# Patient Record
Sex: FEMALE | URBAN - METROPOLITAN AREA
[De-identification: names, ages, dates, MRNs, and addresses within clinical notes are randomized per-mention and may not be internally consistent; named-entity substitution may affect disease eponyms.]

---

## 2020-09-02 ENCOUNTER — APPOINTMENT (OUTPATIENT)
Dept: URBAN - METROPOLITAN AREA SURGERY 18 | Age: 70
Setting detail: DERMATOLOGY
End: 2020-09-02

## 2020-09-02 VITALS — SYSTOLIC BLOOD PRESSURE: 120 MMHG | TEMPERATURE: 97.8 F | HEART RATE: 60 BPM | DIASTOLIC BLOOD PRESSURE: 75 MMHG

## 2020-09-02 PROBLEM — D04.39 CARCINOMA IN SITU OF SKIN OF OTHER PARTS OF FACE: Status: ACTIVE | Noted: 2020-09-02

## 2020-09-02 PROCEDURE — 17311 MOHS 1 STAGE H/N/HF/G: CPT

## 2020-09-02 PROCEDURE — 17312 MOHS ADDL STAGE: CPT

## 2020-09-02 PROCEDURE — OTHER REFERRAL CORRESPONDENCE: OTHER

## 2020-09-02 PROCEDURE — OTHER COUNSELING: OTHER

## 2020-09-02 PROCEDURE — OTHER MOHS SURGERY: OTHER

## 2021-11-09 ENCOUNTER — APPOINTMENT (OUTPATIENT)
Dept: URBAN - METROPOLITAN AREA SURGERY 18 | Age: 71
Setting detail: DERMATOLOGY
End: 2021-11-12

## 2021-11-09 VITALS
TEMPERATURE: 97.5 F | RESPIRATION RATE: 22 BRPM | SYSTOLIC BLOOD PRESSURE: 98 MMHG | HEART RATE: 59 BPM | DIASTOLIC BLOOD PRESSURE: 55 MMHG

## 2021-11-09 PROBLEM — C44.321 SQUAMOUS CELL CARCINOMA OF SKIN OF NOSE: Status: ACTIVE | Noted: 2021-11-09

## 2021-11-09 PROCEDURE — OTHER REFERRAL CORRESPONDENCE: OTHER

## 2021-11-09 PROCEDURE — 17311 MOHS 1 STAGE H/N/HF/G: CPT

## 2021-11-09 PROCEDURE — 17312 MOHS ADDL STAGE: CPT

## 2021-11-09 PROCEDURE — OTHER MOHS SURGERY: OTHER

## 2021-11-09 PROCEDURE — OTHER COUNSELING: OTHER

## 2021-11-09 PROCEDURE — 13152 CMPLX RPR E/N/E/L 2.6-7.5 CM: CPT

## 2021-11-09 NOTE — PROCEDURE: MOHS SURGERY
Hx of migraines.  Migraine and left sided jaw pain started yesterday along with generalized muscle tightness. Pt denies any TMJ. She has been only eating soft foods in attempt to decrease pain with no relief.   Loose cough noted at triage; pt reports seasonal allergies.    Depth Of Tumor Invasion (For Histology): dermis

## 2021-11-09 NOTE — HPI: PROCEDURE (MOHS)
Has The Growth Been Previously Biopsied?: has been previously biopsied
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
Year Removed: 1900

## 2021-11-23 ENCOUNTER — APPOINTMENT (OUTPATIENT)
Dept: URBAN - METROPOLITAN AREA SURGERY 18 | Age: 71
Setting detail: DERMATOLOGY
End: 2021-11-25

## 2021-11-23 DIAGNOSIS — Z48.817 ENCOUNTER FOR SURGICAL AFTERCARE FOLLOWING SURGERY ON THE SKIN AND SUBCUTANEOUS TISSUE: ICD-10-CM

## 2021-11-23 PROCEDURE — OTHER POST-OP WOUND CHECK: OTHER

## 2021-11-23 PROCEDURE — 99024 POSTOP FOLLOW-UP VISIT: CPT

## 2021-11-23 ASSESSMENT — LOCATION SIMPLE DESCRIPTION DERM: LOCATION SIMPLE: NOSE

## 2021-11-23 ASSESSMENT — LOCATION ZONE DERM: LOCATION ZONE: NOSE

## 2021-11-23 ASSESSMENT — LOCATION DETAILED DESCRIPTION DERM: LOCATION DETAILED: RIGHT NASAL DORSUM

## 2021-11-23 NOTE — PROCEDURE: POST-OP WOUND CHECK
Add 06997 Cpt? (Important Note: In 2017 The Use Of 58237 Is Being Tracked By Cms To Determine Future Global Period Reimbursement For Global Periods): yes
Additional Comments: Well approximated suture line. Healing within normal limits. Mild erythema lateral to incision, pt complains of reaction to tape. Cleaned with sterile saline, dressed with Vaseline, Telfa, and tape. Pt to begin daily dressing changes. At 5 weeks PostOp the patient can begin gentle massage. Educated pt about evidence of spitting suture and to call clinic if that arises, and importance of sunscreen as well as sun protection to surgical area. Pt also concerned about erythema at left nasolabial fold, not a concern from surgery (previously treated by her dermatologist for irritation/dry flaking in this area). Appears to be seborrheic dermatitis, potentially compounded by irritant/allergic contact dermatitis (Pt has been using triple antibiotic ointment which we advised her to stop using). Will use OTC hydrocortisone for 5-7 days to decrease inflammation.She has an appointment with her primary dermatologist next week, but will call and be seen sooner should the redness change in any way (expand, new/changing symptoms, new lesions/color change within the affected area).
Detail Level: Detailed
Wound Evaluated By: Dr. Jaya Vinson

## 2022-03-08 NOTE — PROCEDURE: MOHS SURGERY
[facility]  [address] [address]  Tel. [phone]  Fax [phone]    March 10, 2022    Nathaly Rodriguez  2001  [address] [facility] [address]      To Whom it May Concern:    Nathaly Rodriguez has a long history of anxiety and depression and would greatly benefit from the comfort of a reasonble pet. She has a cat. Please allow her to keep this cat in her apartment  For emotional support.     For questions or concerns call the [facility] at [phone] (Peds).    Sincerely,        Muna Campbell MD     A-T Advancement Flap Text: Given the location of the defect, inherent tension at the surgical site, and the proximity to free margins an A to T advancement flap was deemed most appropriate for wound reconstruction. The risks, benefits, and possible outcomes of this procedure were discussed along with the risks, benefits, and possible outcomes of other options for wound repair (including but not limited to: complex closure, other flaps, grafts, and second intention). The patient verbalized understanding and consent to the outlined procedure. The patient verbalized understanding that intraoperative conditions may necessitate a change in the outlined procedure resulting in modification of the original surgical plan. This decision may be made at the discretion of the surgeon, and is due to factors subject to change or that are difficult to predict preoperatively. Using a sterile surgical marker, an appropriate A to T advancement flap was drawn incorporating the defect and placing the expected incisions within the relaxed skin tension lines where possible. The surgical site and surrounding skin were prepped and draped in sterile fashion.  Standing cones were removed from opposite ends of the defect within the appropriate surgical plane, repositioning as necessary based upon local tension, proximity to free margins/other anatomic structures, and position of the relaxed skin tension lines. The shape of one standing cone was modified, taking two smaller standing cones (each approximately half the size of the traditional standing cone) and repositioning them along opposite ends of a lateral plane (with respect to the defect).  The resulting defect was undermined widely and bilaterally in the appropriate surgical plane taking care to preserve local arteries, veins, nerves, and other structures of anatomic importance. This created a sliding flap capable of advancing across the defect to close the wound under minimal tension. Hemostasis was achieved and then the wound was sutured in layered fashion.

## 2022-06-15 ENCOUNTER — SURGERY/PROCEDURE (OUTPATIENT)
Facility: LOCATION | Age: 72
End: 2022-06-15

## 2022-06-15 DIAGNOSIS — H25.813: ICD-10-CM

## 2022-06-15 PROCEDURE — 6698454 REMOVE CATARACT;INSERT LENS (SX ONLY)

## 2022-06-16 ENCOUNTER — POST-OP (OUTPATIENT)
Facility: LOCATION | Age: 72
End: 2022-06-16

## 2022-06-16 DIAGNOSIS — H40.013: ICD-10-CM

## 2022-06-16 PROCEDURE — 92133 CPTRZD OPH DX IMG PST SGM ON: CPT

## 2022-06-16 ASSESSMENT — VISUAL ACUITY
OD_SC: 20/20-2
OS_SC: 20/30-2

## 2022-06-16 ASSESSMENT — TONOMETRY
OD_IOP_MMHG: 08
OS_IOP_MMHG: 05
OD_IOP_MMHG: 13
OS_IOP_MMHG: 111

## 2022-10-25 ENCOUNTER — ESTABLISHED PATIENT (OUTPATIENT)
Facility: LOCATION | Age: 72
End: 2022-10-25

## 2022-10-25 DIAGNOSIS — E11.9: ICD-10-CM

## 2022-10-25 DIAGNOSIS — H40.013: ICD-10-CM

## 2022-10-25 DIAGNOSIS — H04.123: ICD-10-CM

## 2022-10-25 DIAGNOSIS — Z96.1: ICD-10-CM

## 2022-10-25 PROCEDURE — 99213 OFFICE O/P EST LOW 20 MIN: CPT

## 2022-10-25 ASSESSMENT — TONOMETRY
OD_IOP_MMHG: 14
OS_IOP_MMHG: 13

## 2022-10-25 ASSESSMENT — VISUAL ACUITY
OS_SC: 20/400
OS_PH: 20/30-2
OD_SC: J16
OS_SC: 20/40-1
OD_SC: 20/30+2

## 2023-06-21 ENCOUNTER — APPOINTMENT (OUTPATIENT)
Dept: URBAN - METROPOLITAN AREA SURGERY 18 | Age: 73
Setting detail: DERMATOLOGY
End: 2023-06-22

## 2023-06-21 VITALS — DIASTOLIC BLOOD PRESSURE: 69 MMHG | HEART RATE: 80 BPM | SYSTOLIC BLOOD PRESSURE: 127 MMHG

## 2023-06-21 VITALS — DIASTOLIC BLOOD PRESSURE: 80 MMHG | SYSTOLIC BLOOD PRESSURE: 110 MMHG

## 2023-06-21 VITALS — SYSTOLIC BLOOD PRESSURE: 85 MMHG | DIASTOLIC BLOOD PRESSURE: 55 MMHG

## 2023-06-21 VITALS — SYSTOLIC BLOOD PRESSURE: 115 MMHG | DIASTOLIC BLOOD PRESSURE: 85 MMHG

## 2023-06-21 PROBLEM — C44.311 BASAL CELL CARCINOMA OF SKIN OF NOSE: Status: ACTIVE | Noted: 2023-06-21

## 2023-06-21 PROCEDURE — OTHER REFERRAL CORRESPONDENCE: OTHER

## 2023-06-21 PROCEDURE — 17312 MOHS ADDL STAGE: CPT

## 2023-06-21 PROCEDURE — OTHER MIPS QUALITY: OTHER

## 2023-06-21 PROCEDURE — OTHER COUNSELING: OTHER

## 2023-06-21 PROCEDURE — OTHER MOHS SURGERY: OTHER

## 2023-06-21 PROCEDURE — 15260 FTH/GFT FR N/E/E/L 20 SQCM/<: CPT

## 2023-06-21 PROCEDURE — 17311 MOHS 1 STAGE H/N/HF/G: CPT

## 2023-06-21 NOTE — PROCEDURE: MIPS QUALITY
Quality 47: Advance Care Plan: Advance Care Planning discussed and documented; advance care plan or surrogate decision maker documented in the medical record.
Quality 110: Preventive Care And Screening: Influenza Immunization: Influenza Immunization not Administered because Patient Refused.
Detail Level: Detailed
Quality 111:Pneumonia Vaccination Status For Older Adults: Patient did not receive any pneumococcal conjugate or polysaccharide vaccine on or after their 60th birthday and before the end of the measurement period
Quality 226: Preventive Care And Screening: Tobacco Use: Screening And Cessation Intervention: Patient screened for tobacco use and is an ex/non-smoker

## 2023-06-28 ENCOUNTER — APPOINTMENT (OUTPATIENT)
Dept: URBAN - METROPOLITAN AREA SURGERY 18 | Age: 73
Setting detail: DERMATOLOGY
End: 2023-06-29

## 2023-06-28 DIAGNOSIS — Z48.817 ENCOUNTER FOR SURGICAL AFTERCARE FOLLOWING SURGERY ON THE SKIN AND SUBCUTANEOUS TISSUE: ICD-10-CM

## 2023-06-28 PROCEDURE — OTHER POST-OP WOUND CHECK: OTHER

## 2023-06-28 PROCEDURE — 99024 POSTOP FOLLOW-UP VISIT: CPT

## 2023-06-28 ASSESSMENT — LOCATION DETAILED DESCRIPTION DERM: LOCATION DETAILED: NASAL ROOT

## 2023-06-28 ASSESSMENT — LOCATION ZONE DERM: LOCATION ZONE: NOSE

## 2023-06-28 ASSESSMENT — LOCATION SIMPLE DESCRIPTION DERM: LOCATION SIMPLE: NOSE

## 2023-06-28 NOTE — PROCEDURE: POST-OP WOUND CHECK
Detail Level: Detailed
Add 80131 Cpt? (Important Note: In 2017 The Use Of 88048 Is Being Tracked By Cms To Determine Future Global Period Reimbursement For Global Periods): yes
Wound Evaluated By: Dr. Jaya Vinson
Additional Comments: Patient presents for 1 week wound check. Graft site well vascularized and well contoured. No signs/symptoms of infection or poor wound healing. Mild crusting visible and site cleaned with Hibiclens and sterile saline. Patient instructed to keep bandage on and dry for 1 additional week, then to begin daily dressing changes. Patient reminded to still take it easy. Site dressed with Vaseline, Telfa, and Hypafix. Follow up in 2 weeks.

## 2023-07-12 ENCOUNTER — APPOINTMENT (OUTPATIENT)
Dept: URBAN - METROPOLITAN AREA SURGERY 18 | Age: 73
Setting detail: DERMATOLOGY
End: 2023-07-13

## 2023-07-12 DIAGNOSIS — Z48.817 ENCOUNTER FOR SURGICAL AFTERCARE FOLLOWING SURGERY ON THE SKIN AND SUBCUTANEOUS TISSUE: ICD-10-CM

## 2023-07-12 PROCEDURE — 99024 POSTOP FOLLOW-UP VISIT: CPT

## 2023-07-12 PROCEDURE — OTHER POST-OP WOUND CHECK: OTHER

## 2023-07-12 ASSESSMENT — LOCATION SIMPLE DESCRIPTION DERM: LOCATION SIMPLE: NOSE

## 2023-07-12 ASSESSMENT — LOCATION DETAILED DESCRIPTION DERM: LOCATION DETAILED: NASAL ROOT

## 2023-07-12 ASSESSMENT — LOCATION ZONE DERM: LOCATION ZONE: NOSE

## 2023-07-12 NOTE — PROCEDURE: POST-OP WOUND CHECK
Detail Level: Detailed
Add 56156 Cpt? (Important Note: In 2017 The Use Of 31318 Is Being Tracked By Cms To Determine Future Global Period Reimbursement For Global Periods): yes
Wound Evaluated By: Dr. Jaya Vinson
Additional Comments: Patient presents for 3 week wound check. Site well approximated and focally crusted. Site cleaned with Hibiclens. Vaseline and bandage were applied to the graft site. Patient encouraged to get area wet in shower and when washing face. Patient instructed to continue daily dressing changes until fully healed. Patient educated on importance of sunscreen use as well as sun protection to the area. Follow up in 6 weeks.

## 2023-08-23 ENCOUNTER — APPOINTMENT (OUTPATIENT)
Dept: URBAN - METROPOLITAN AREA SURGERY 18 | Age: 73
Setting detail: DERMATOLOGY
End: 2023-08-24

## 2023-08-23 DIAGNOSIS — Z48.817 ENCOUNTER FOR SURGICAL AFTERCARE FOLLOWING SURGERY ON THE SKIN AND SUBCUTANEOUS TISSUE: ICD-10-CM

## 2023-08-23 PROCEDURE — OTHER MIPS QUALITY: OTHER

## 2023-08-23 PROCEDURE — 99024 POSTOP FOLLOW-UP VISIT: CPT

## 2023-08-23 PROCEDURE — OTHER POST-OP WOUND CHECK: OTHER

## 2023-08-23 ASSESSMENT — LOCATION SIMPLE DESCRIPTION DERM: LOCATION SIMPLE: NOSE

## 2023-08-23 ASSESSMENT — LOCATION ZONE DERM: LOCATION ZONE: NOSE

## 2023-08-23 ASSESSMENT — LOCATION DETAILED DESCRIPTION DERM: LOCATION DETAILED: NASAL ROOT

## 2023-08-23 NOTE — PROCEDURE: POST-OP WOUND CHECK
Detail Level: Detailed
Add 82913 Cpt? (Important Note: In 2017 The Use Of 97445 Is Being Tracked By Cms To Determine Future Global Period Reimbursement For Global Periods): yes
Wound Evaluated By: Dr. Jaya Vinson
Additional Comments: Patient presents for a 9 week post Mohs graft wound check. Surgical site is continuing to heal appropriately and is well contoured and vascularized. Advised patient she can continue to massage if the area and be diligent about UV protection. Patient to follow-up as needed.

## 2023-08-28 ENCOUNTER — ESTABLISHED PATIENT (OUTPATIENT)
Facility: LOCATION | Age: 73
End: 2023-08-28

## 2023-08-28 DIAGNOSIS — H26.493: ICD-10-CM

## 2023-08-28 DIAGNOSIS — Z96.1: ICD-10-CM

## 2023-08-28 DIAGNOSIS — H40.013: ICD-10-CM

## 2023-08-28 PROCEDURE — 92015KEC REFRACTION KEC

## 2023-08-28 PROCEDURE — 92133 CPTRZD OPH DX IMG PST SGM ON: CPT

## 2023-08-28 PROCEDURE — 92014 COMPRE OPH EXAM EST PT 1/>: CPT

## 2023-08-28 ASSESSMENT — TONOMETRY
OD_IOP_MMHG: 12
OS_IOP_MMHG: 13

## 2023-08-28 ASSESSMENT — VISUAL ACUITY
OD_CC: 20/25
OS_CC: 20/30+2
OD_CC: J3
OS_CC: J3

## 2023-12-05 ENCOUNTER — CONSULTATION/EVALUATION (OUTPATIENT)
Facility: LOCATION | Age: 73
End: 2023-12-05

## 2023-12-05 DIAGNOSIS — H26.493: ICD-10-CM

## 2023-12-05 PROCEDURE — 99214 OFFICE O/P EST MOD 30 MIN: CPT

## 2023-12-05 PROCEDURE — 66821 AFTER CATARACT LASER SURGERY: CPT

## 2023-12-05 ASSESSMENT — VISUAL ACUITY
OS_SC: 20/50-1
OD_BAT: 20/40
OU_SC: J7
OS_BAT: 20/30
OU_CC: 20/25
OS_CC: J2
OU_SC: 20/30
OU_CC: J1-1
OS_PH: 20/40-1
OS_CC: 20/25
OD_CC: J1
OD_SC: 20/40
OD_PH: 20/30-1
OS_SC: J16
OD_CC: 20/25-1
OD_SC: J7

## 2023-12-05 ASSESSMENT — TONOMETRY
OD_IOP_MMHG: 14
OS_IOP_MMHG: 13

## 2024-01-02 ENCOUNTER — POST-OP (OUTPATIENT)
Facility: LOCATION | Age: 74
End: 2024-01-02

## 2024-01-02 DIAGNOSIS — Z96.1: ICD-10-CM

## 2024-01-02 PROCEDURE — 99024 POSTOP FOLLOW-UP VISIT: CPT

## 2024-01-02 ASSESSMENT — VISUAL ACUITY
OS_CC: 20/20
OD_CC: 20/20

## 2024-01-02 ASSESSMENT — TONOMETRY
OD_IOP_MMHG: 14
OS_IOP_MMHG: 14

## 2024-12-12 ENCOUNTER — COMPREHENSIVE EXAM (OUTPATIENT)
Age: 74
End: 2024-12-12

## 2024-12-12 DIAGNOSIS — H16.223: ICD-10-CM

## 2024-12-12 DIAGNOSIS — E11.9: ICD-10-CM

## 2024-12-12 DIAGNOSIS — H52.4: ICD-10-CM

## 2024-12-12 DIAGNOSIS — Z96.1: ICD-10-CM

## 2024-12-12 PROCEDURE — 99214 OFFICE O/P EST MOD 30 MIN: CPT

## 2024-12-12 PROCEDURE — 92015KEC REFRACTION KEC

## 2025-03-11 ENCOUNTER — COMPREHENSIVE EXAM (OUTPATIENT)
Age: 75
End: 2025-03-11

## 2025-03-11 DIAGNOSIS — E11.9: ICD-10-CM

## 2025-03-11 DIAGNOSIS — H52.4: ICD-10-CM

## 2025-03-11 DIAGNOSIS — H16.223: ICD-10-CM

## 2025-03-11 DIAGNOSIS — H04.213: ICD-10-CM

## 2025-03-11 DIAGNOSIS — Z96.1: ICD-10-CM

## 2025-03-11 PROCEDURE — 99213 OFFICE O/P EST LOW 20 MIN: CPT
